# Patient Record
Sex: MALE | Race: WHITE | NOT HISPANIC OR LATINO | Employment: FULL TIME | URBAN - METROPOLITAN AREA
[De-identification: names, ages, dates, MRNs, and addresses within clinical notes are randomized per-mention and may not be internally consistent; named-entity substitution may affect disease eponyms.]

---

## 2018-02-07 ENCOUNTER — TELEPHONE (OUTPATIENT)
Dept: OTHER | Facility: CLINIC | Age: 33
End: 2018-02-07

## 2018-02-07 NOTE — TELEPHONE ENCOUNTER
"Patient Communication Preferences indicate  Do not contact  and/or communication by \"Phone\" is not preferred. Call not required per Outreach team.      Outreach ,  David Singleton     "

## 2018-05-14 ENCOUNTER — RECORDS - HEALTHEAST (OUTPATIENT)
Dept: LAB | Facility: CLINIC | Age: 33
End: 2018-05-14

## 2018-05-14 LAB
ALBUMIN SERPL-MCNC: 3.9 G/DL (ref 3.5–5)
ALP SERPL-CCNC: 59 U/L (ref 45–120)
ALT SERPL W P-5'-P-CCNC: 49 U/L (ref 0–45)
ANION GAP SERPL CALCULATED.3IONS-SCNC: 7 MMOL/L (ref 5–18)
AST SERPL W P-5'-P-CCNC: 27 U/L (ref 0–40)
BILIRUB SERPL-MCNC: 1 MG/DL (ref 0–1)
BUN SERPL-MCNC: 15 MG/DL (ref 8–22)
CALCIUM SERPL-MCNC: 9.7 MG/DL (ref 8.5–10.5)
CHLORIDE BLD-SCNC: 103 MMOL/L (ref 98–107)
CO2 SERPL-SCNC: 30 MMOL/L (ref 22–31)
CREAT SERPL-MCNC: 0.85 MG/DL (ref 0.7–1.3)
ERYTHROCYTE [DISTWIDTH] IN BLOOD BY AUTOMATED COUNT: 14 % (ref 11–14.5)
GFR SERPL CREATININE-BSD FRML MDRD: >60 ML/MIN/1.73M2
GLUCOSE BLD-MCNC: 103 MG/DL (ref 70–125)
HCT VFR BLD AUTO: 41.1 % (ref 40–54)
HGB BLD-MCNC: 13.7 G/DL (ref 14–18)
MCH RBC QN AUTO: 30 PG (ref 27–34)
MCHC RBC AUTO-ENTMCNC: 33.3 G/DL (ref 32–36)
MCV RBC AUTO: 90 FL (ref 80–100)
PLATELET # BLD AUTO: 217 THOU/UL (ref 140–440)
PMV BLD AUTO: 10.9 FL (ref 8.5–12.5)
POTASSIUM BLD-SCNC: 4.3 MMOL/L (ref 3.5–5)
PROT SERPL-MCNC: 7.5 G/DL (ref 6–8)
RBC # BLD AUTO: 4.57 MILL/UL (ref 4.4–6.2)
SODIUM SERPL-SCNC: 140 MMOL/L (ref 136–145)
TSH SERPL DL<=0.005 MIU/L-ACNC: 2.17 UIU/ML (ref 0.3–5)
URATE SERPL-MCNC: 6.9 MG/DL (ref 3–8)
WBC: 7.5 THOU/UL (ref 4–11)

## 2018-05-15 ENCOUNTER — RECORDS - HEALTHEAST (OUTPATIENT)
Dept: LAB | Facility: CLINIC | Age: 33
End: 2018-05-15

## 2018-05-15 LAB — FERRITIN SERPL-MCNC: 130 NG/ML (ref 27–300)

## 2018-05-17 LAB
GLIADIN IGA SER-ACNC: 1 U/ML
GLIADIN IGG SER-ACNC: <0.4 U/ML
IGA SERPL-MCNC: 274 MG/DL (ref 65–400)
TTG IGA SER-ACNC: 0.3 U/ML
TTG IGG SER-ACNC: <0.6 U/ML

## 2018-06-15 ASSESSMENT — MIFFLIN-ST. JEOR: SCORE: 2328.79

## 2018-06-19 ENCOUNTER — SURGERY - HEALTHEAST (OUTPATIENT)
Dept: SURGERY | Facility: CLINIC | Age: 33
End: 2018-06-19

## 2018-06-19 ENCOUNTER — ANESTHESIA - HEALTHEAST (OUTPATIENT)
Dept: SURGERY | Facility: CLINIC | Age: 33
End: 2018-06-19

## 2018-06-19 ASSESSMENT — MIFFLIN-ST. JEOR
SCORE: 2328.79
SCORE: 2328.79

## 2018-06-25 ENCOUNTER — NURSE TRIAGE (OUTPATIENT)
Dept: NURSING | Facility: CLINIC | Age: 33
End: 2018-06-25

## 2018-06-26 ENCOUNTER — APPOINTMENT (OUTPATIENT)
Dept: ULTRASOUND IMAGING | Facility: CLINIC | Age: 33
End: 2018-06-26
Attending: EMERGENCY MEDICINE
Payer: COMMERCIAL

## 2018-06-26 ENCOUNTER — HOSPITAL ENCOUNTER (EMERGENCY)
Facility: CLINIC | Age: 33
Discharge: HOME | End: 2018-06-26
Attending: EMERGENCY MEDICINE | Admitting: EMERGENCY MEDICINE
Payer: COMMERCIAL

## 2018-06-26 VITALS
HEIGHT: 72 IN | OXYGEN SATURATION: 97 % | BODY MASS INDEX: 40.63 KG/M2 | SYSTOLIC BLOOD PRESSURE: 140 MMHG | TEMPERATURE: 97.9 F | DIASTOLIC BLOOD PRESSURE: 81 MMHG | WEIGHT: 300 LBS | RESPIRATION RATE: 18 BRPM | HEART RATE: 78 BPM

## 2018-06-26 DIAGNOSIS — M79.661 PAIN OF RIGHT LOWER LEG: ICD-10-CM

## 2018-06-26 DIAGNOSIS — I82.4Z1 ACUTE DEEP VEIN THROMBOSIS (DVT) OF DISTAL VEIN OF RIGHT LOWER EXTREMITY (H): ICD-10-CM

## 2018-06-26 PROCEDURE — 25000128 H RX IP 250 OP 636: Performed by: EMERGENCY MEDICINE

## 2018-06-26 PROCEDURE — 93971 EXTREMITY STUDY: CPT | Mod: RT

## 2018-06-26 PROCEDURE — 25000132 ZZH RX MED GY IP 250 OP 250 PS 637: Performed by: EMERGENCY MEDICINE

## 2018-06-26 PROCEDURE — 96372 THER/PROPH/DIAG INJ SC/IM: CPT | Performed by: EMERGENCY MEDICINE

## 2018-06-26 PROCEDURE — 99284 EMERGENCY DEPT VISIT MOD MDM: CPT | Mod: Z6 | Performed by: EMERGENCY MEDICINE

## 2018-06-26 PROCEDURE — 99285 EMERGENCY DEPT VISIT HI MDM: CPT | Mod: 25 | Performed by: EMERGENCY MEDICINE

## 2018-06-26 RX ORDER — WARFARIN SODIUM 5 MG/1
5 TABLET ORAL DAILY
Qty: 7 TABLET | Refills: 0 | Status: SHIPPED | OUTPATIENT
Start: 2018-06-26 | End: 2018-07-18

## 2018-06-26 RX ORDER — OXYCODONE AND ACETAMINOPHEN 5; 325 MG/1; MG/1
1-2 TABLET ORAL
COMMUNITY
Start: 2018-06-10 | End: 2018-06-26

## 2018-06-26 RX ORDER — IBUPROFEN 600 MG/1
600 TABLET, FILM COATED ORAL
COMMUNITY
Start: 2018-04-08 | End: 2018-06-26

## 2018-06-26 RX ORDER — ASPIRIN 325 MG
325 TABLET ORAL
COMMUNITY
Start: 2018-06-20 | End: 2018-06-26

## 2018-06-26 RX ORDER — OXYCODONE AND ACETAMINOPHEN 5; 325 MG/1; MG/1
1-2 TABLET ORAL EVERY 4 HOURS PRN
Status: DISCONTINUED | OUTPATIENT
Start: 2018-06-26 | End: 2018-06-26 | Stop reason: HOSPADM

## 2018-06-26 RX ORDER — WARFARIN SODIUM 5 MG/1
5 TABLET ORAL ONCE
Status: COMPLETED | OUTPATIENT
Start: 2018-06-26 | End: 2018-06-26

## 2018-06-26 RX ORDER — HYDROMORPHONE HYDROCHLORIDE 2 MG/1
2 TABLET ORAL ONCE
Status: COMPLETED | OUTPATIENT
Start: 2018-06-26 | End: 2018-06-26

## 2018-06-26 RX ORDER — OXYCODONE AND ACETAMINOPHEN 5; 325 MG/1; MG/1
1-2 TABLET ORAL EVERY 4 HOURS PRN
Qty: 10 TABLET | Refills: 0 | Status: SHIPPED | OUTPATIENT
Start: 2018-06-26 | End: 2018-07-06

## 2018-06-26 RX ORDER — CELECOXIB 200 MG/1
200 CAPSULE ORAL
COMMUNITY
Start: 2018-06-20 | End: 2018-06-26

## 2018-06-26 RX ORDER — AMOXICILLIN 250 MG
1 CAPSULE ORAL
COMMUNITY
Start: 2018-06-20 | End: 2018-07-06

## 2018-06-26 RX ADMIN — WARFARIN SODIUM 5 MG: 5 TABLET ORAL at 03:53

## 2018-06-26 RX ADMIN — ENOXAPARIN SODIUM 135 MG: 150 INJECTION SUBCUTANEOUS at 03:53

## 2018-06-26 RX ADMIN — HYDROMORPHONE HYDROCHLORIDE 2 MG: 2 TABLET ORAL at 01:58

## 2018-06-26 NOTE — ED AVS SNAPSHOT
[facility] Emergency Department    [address] [address]    Phone:  [phone]    Fax:  [phone]                                       Timbo Carcamo   MRN: [id number]    Department:  [facility] Emergency Department   Date of Visit:  6/26/2018           After Visit Summary Signature Page     I have received my discharge instructions, and my questions have been answered. I have discussed any challenges I see with this plan with the nurse or doctor.    ..........................................................................................................................................  Patient/Patient Representative Signature      ..........................................................................................................................................  Patient Representative Print Name and Relationship to Patient    ..................................................               ................................................  Date                                            Time    ..........................................................................................................................................  Reviewed by Signature/Title    ...................................................              ..............................................  Date                                                            Time

## 2018-06-26 NOTE — ED AVS SNAPSHOT
[facility] Emergency Department    [address] [address]    Phone:  [phone]    Fax:  [phone]                                       Timbo Carcamo   MRN: [id number]    Department:  [facility] Emergency Department   Date of Visit:  6/26/2018           Patient Information     Date Of Birth          1985        Your diagnoses for this visit were:     Pain of right lower leg     Acute deep vein thrombosis (DVT) of distal vein of right lower extremity (H) post operative       You were seen by Jose Tamayo MD.      Follow-up Information     Follow up with Marquita Mayfield PA-C.    Specialty:  Physician Assistant    Why:  As needed    Contact information:    [address] [facility] [id number] [phone]          Follow up with [facility] Emergency Department.    Specialty:  EMERGENCY MEDICINE    Why:  If symptoms worsen    Contact information:    [address] [facility] [address] [phone]    Additional information:    The medical center is located at   [address] [place] [address] (between [facility] and   [facility] [place], [place] miles north   of [place]).        Discharge Instructions       Return if symptoms worsen or new symptoms develop.  Follow-up with your primary care physician later this week for recheck.  Take Lovenox as directed take Coumadin as directed.  Follow-up with Coumadin clinic in the next 2-3 days for check of blood counts.  If any bleeding increased pain fevers shortness of breath or other symptoms present please return for further evaluation and care.  Understanding Deep Vein Thrombosis  Deep vein thrombosis (DVT) is a condition in which a blood clot or thrombus forms in a deep vein. A blood clot is most common in the leg, but can develop in a large vein deep inside the leg, arm, or other part of the body. Part of the clot called an embolus can separate from the vein. It may travel to the lungs and form a  pulmonary embolus (PE). This can cut off the flow to a portion of the lung or to the entire lung. A PE is a medical emergency and may cause death. Healthcare providers use the term venous thromboembolism (VTE) to describe the two conditions, DVT and PE. They use the term VTE because the two conditions are very closely related. And, because their prevention and treatment are closely related.  Over time, a blood clot can also permanently damage veins. To protect your health, a blood clot must be treated right away.  How DVT develops  The deep veins of the legs are located in the muscles. These help carry blood clot from the legs to the heart. When leg muscles contract and relax, blood is squeezed through the veins back to the heart. One-way valves inside the veins help keep the blood moving in the right direction. When blood moves too slowly or not at all, it can pool in the veins. This makes a clot more likely to form.    Risk factors  Anyone can develop a blood clot. The following risk factors make a blood clot more likely to happen:    Being inactive for a long period, such as when you re in the hospital, or traveling by plane or car    Injury to a vein from an accident, a broken bone, or surgery    Having blood clots in the past    Personal or family history of a blood-clotting disorder    Recent surgery    Cancer and certain cancer treatments    Smoking  Other factors can also put you at higher risk for a blood clot. They include:    Age over 60 years    Pregnancy    Taking birth control or hormone replacement    Having other vein problems    Being overweight  Common symptoms  A blood clot does not always cause obvious symptoms. If you do have symptoms, they usually happen suddenly. They may include:    Pain, especially deep in the muscle    Swelling    Aching or tenderness    Red or warm skin    Fever  Call your healthcare provider if you have these symptoms.  Symptoms of pulmonary embolism may  include:    Trouble breathing    Fast heartbeat    Chest pain    Sweating    Coughing (may cough up blood)    Fainting  Call 911 if you have these symptoms.   If you take medicine to help prevent blood clots, you have an increased risk of bleeding. Call 911 if you have heavy or uncontrolled bleeding. Call your healthcare provider if you have other signs or symptoms of bleeding like blood in the urine, bleeding with bowel movements, or bleeding from the nose, gums, a cut, or vagina.  Diagnosing DVT  Diagnosis begins with thorough questions about your symptoms and medical history along with a physical exam.  Diagnostic tests include:    An imaging test called a duplex ultrasound. This test uses sound waves to create pictures of veins and blood flow.    Blood tests to check for clotting and other problems.  If your healthcare provider thinks you may have pulmonary embolism, additional testing will be done.  Treating DVT  Treating a blood clot may include:    Medicine to thin the blood and prevent pulmonary embolism and other complications.    Staying in the hospital. This may or may not be necessary.    Surgery for some people, like those who cannot take blood-thinning medicines.  Preventing DVT  Many people who are in the hospital are at an increased risk of developing blood clots. So, preventing blood clots is an important part of in-hospital care. The care may include getting out of bed regularly, taking medicine, or using special therapies or devices. Other factors and conditions may increase the risk of blood clots. Review your risk with your healthcare provider.  Date Last Reviewed: 5/1/2016 2000-2017 [employer]. [address] [place] [address], [place] [address]. All rights reserved. This information is not intended as a substitute for professional medical care. Always follow your healthcare professional's instructions.            * Deep Vein Thrombosis    Deep Vein Thrombosis (DVT) means there is a  blood clot in a deep vein of the leg. This may cause redness, swelling, warmth and pain of the leg. If the blood clot grows larger, a piece may break off and go to the lungs (pulmonary embolus)  Factors that increase risk of a DVT include: overweight, smoking, use of estrogen replacement therapy. Prolonged periods without movement (such as long distance travel, wearing a fracture cast, and prolonged bed rest after surgery or during an illness) also increases the risk of a DVT.  Home Care:    Wear compression stockings as directed by your doctor.    Move your ankles, toes and knees frequently to stimulate blood flow.    You will be prescribed a blood-thinning (anti-coagulant) medicine, either pills or shots. Take it exactly as directed.  Follow Up  Follow up with your doctor as advised. You will need regular blood tests to check your INR (International Normalized Ratio).  Get Prompt Medical Attention  Call your doctor or 911 if any of the following occur:    Shortness of breath or painful breathing    Chest pain, repeated cough or coughing up blood    Increasing swelling or increasing pain in the leg    Spreading redness    Unexpected bleeding (nose, gums, cuts, urinary tract, vagina, rectum)    0120-9619 [employer]. [address] [place] [address]. All rights reserved. This information is not intended as a substitute for professional medical care. Always follow your healthcare professional's instructions.  This information has been modified by your health care provider with permission from the publisher.          24 Hour Appointment Hotline       To make an appointment at any [facility] clinic, call [phone] ([phone]). If you don't have a family doctor or clinic, we will help you find one. [facility] are conveniently located to serve the needs of you and your family.          ED Discharge Orders     INR CLINIC REFERRAL       Your provider has referred you to INR  Services.    Please be aware that coverage of these services is subject to the terms and limitations of your health insurance plan.  Call member services at your health plan with any benefit or coverage questions.    Indication for Anticoagulation: DVT (first time)    Expected Duration of Therapy: 3 Months                     Review of your medicines      START taking        Dose / Directions Last dose taken    enoxaparin 150 MG/ML injection   Commonly known as:  LOVENOX   Dose:  1 mg/kg   Quantity:  14 Syringe        Inject 0.9 mLs (135 mg) Subcutaneous every 12 hours   Refills:  0        warfarin 5 MG tablet   Commonly known as:  COUMADIN   Dose:  5 mg   Quantity:  7 tablet        Take 1 tablet (5 mg) by mouth daily   Refills:  0          CONTINUE these medicines which may have CHANGED, or have new prescriptions. If we are uncertain of the size of tablets/capsules you have at home, strength may be listed as something that might have changed.        Dose / Directions Last dose taken    oxyCODONE-acetaminophen 5-325 MG per tablet   Commonly known as:  PERCOCET   Dose:  1-2 tablet   What changed:    - when to take this  - reasons to take this   Quantity:  10 tablet        Take 1-2 tablets by mouth every 4 hours as needed for pain or breakthrough pain   Refills:  0          Our records show that you are taking the medicines listed below. If these are incorrect, please call your family doctor or clinic.        Dose / Directions Last dose taken    ALLOPURINOL PO   Dose:  300 mg        Take 300 mg by mouth daily   Refills:  0        colchicine 0.6 MG tablet   Commonly known as:  COLCYRS   Dose:  0.6 mg   Quantity:  28 tablet        Take 1 tablet by mouth 2 times daily.   Refills:  0        gabapentin 300 MG capsule   Commonly known as:  NEURONTIN   Dose:  300 mg   Quantity:  90 capsule        Take 1 capsule (300 mg) by mouth 3 times daily OK to refill today. Need to follow up with MD before next refill.   Refills:  0         QUEtiapine 100 MG tablet   Commonly known as:  SEROQUEL   Dose:  100 mg   Quantity:  30 tablet        Take 1 tablet (100 mg) by mouth At Bedtime   Refills:  1        senna-docusate 8.6-50 MG per tablet   Commonly known as:  SENOKOT-S;PERICOLACE   Dose:  1 tablet        Take 1 tablet by mouth   Refills:  0        triamcinolone 0.1 % cream   Commonly known as:  KENALOG   Quantity:  30 g        Apply sparingly to affected area two times daily for 14 days.   Refills:  0                Information about OPIOIDS     PRESCRIPTION OPIOIDS: WHAT YOU NEED TO KNOW   We gave you an opioid (narcotic) pain medicine. It is important to manage your pain, but opioids are not always the best choice. You should first try all the other options your care team gave you. Take this medicine for as short a time (and as few doses) as possible.     These medicines have risks:    DO NOT drive when on new or higher doses of pain medicine. These medicines can affect your alertness and reaction times, and you could be arrested for driving under the influence (DUI). If you need to use opioids long-term, talk to your care team about driving.    DO NOT operate heave machinery    DO NOT do any other dangerous activities while taking these medicines.     DO NOT drink any alcohol while taking these medicines.      If the opioid prescribed includes acetaminophen, DO NOT take with any other medicines that contain acetaminophen. Read all labels carefully. Look for the word  acetaminophen  or  Tylenol.  Ask your pharmacist if you have questions or are unsure.    You can get addicted to pain medicines, especially if you have a history of addiction (chemical, alcohol or substance dependence). Talk to your care team about ways to reduce this risk.    Store your pills in a secure place, locked if possible. We will not replace any lost or stolen medicine. If you don t finish your medicine, please throw away (dispose) as directed by your pharmacist. The  [place] [employer]ution Control Agency has more information about safe disposal: https://www.pca.[facility].[place].us/living-green/managing-unwanted-medications.     All opioids tend to cause constipation. Drink plenty of water and eat foods that have a lot of fiber, such as fruits, vegetables, prune juice, apple juice and high-fiber cereal. Take a laxative (Miralax, milk of magnesia, Colace, Senna) if you don t move your bowels at least every other day.         Prescriptions were sent or printed at these locations (3 Prescriptions)                   Other Prescriptions                Printed at Department/Unit printer (3 of 3)         enoxaparin (LOVENOX) 150 MG/ML injection               oxyCODONE-acetaminophen (PERCOCET) 5-325 MG per tablet               warfarin (COUMADIN) 5 MG tablet                Procedures and tests performed during your visit     US Lower Extremity Venous Duplex Right      Orders Needing Specimen Collection     None      Pending Results     No orders found from 6/24/2018 to 6/27/2018.            Pending Culture Results     No orders found from 6/24/2018 to 6/27/2018.            Pending Results Instructions     If you had any lab results that were not finalized at the time of your Discharge, you can call the ED Lab Result RN at [phone]. You will be contacted by this team for any positive Lab results or changes in treatment. The nurses are available 7 days a week from 10A to 6:30P.  You can leave a message 24 hours per day and they will return your call.        Test Results From Your Hospital Stay        6/26/2018  2:23 AM      Narrative     US LOWER EXTREMITY VENOUS DUPLEX RIGHT  6/26/2018 2:10 AM     HISTORY: Status post right knee surgery, right calf swelling and pain.    TECHNIQUE: Venous Doppler ultrasound of the lower extremity. Color  flow and spectral Doppler with waveform analysis performed.    COMPARISON: None.    FINDINGS: Ultrasound of the right leg demonstrates no deep vein  thrombus  "from the common femoral through popliteal veins. There is DVT  present in the posterior tibial and peroneal veins in the calf.        Impression     IMPRESSION:  1. Study is positive for DVT in the right calf.  2. No thrombus seen in the right lower extremity veins above the level  of the knee.    DIANN COYLE MD                Thank you for choosing [facility]       Thank you for choosing [facility] for your care. Our goal is always to provide you with excellent care. Hearing back from our patients is one way we can continue to improve our services. Please take a few minutes to complete the written survey that you may receive in the mail after you visit with us. Thank you!        [employer] Information     [employer] lets you send messages to your doctor, view your test results, renew your prescriptions, schedule appointments and more. To sign up, go to [email or web address] . Click on \"Log in\" on the left side of the screen, which will take you to the Welcome page. Then click on \"Sign up Now\" on the right side of the page.     You will be asked to enter the access code listed below, as well as some personal information. Please follow the directions to create your username and password.     Your access code is: [email or web address]  Expires: 2018  3:54 AM     Your access code will  in 90 days. If you need help or a new code, please call your [facility] clinic or [phone].        Care EveryWhere ID     This is your Care EveryWhere ID. This could be used by other organizations to access your [facility] medical records  [id number]        Equal Access to Services     LARRY BOYER : Hadii clark chandra hadasho Soomaali, waaxda luqadaha, qaybta kaalmada adeegyada, madeline lange. So [facility] [phone].    ATENCIÓN: Si habla español, tiene a james disposición servicios gratuitos de asistencia lingüística. Llame al [phone].    We comply with applicable federal civil rights laws and [place] laws. We " do not discriminate on the basis of race, color, national origin, age, disability, sex, sexual orientation, or gender identity.            After Visit Summary       This is your record. Keep this with you and show to your community pharmacist(s) and doctor(s) at your next visit.

## 2018-06-26 NOTE — DISCHARGE INSTRUCTIONS
November 13, 2017         Patient: Matheus Mesa Sr.   YOB: 1968   Date of Visit: 11/13/2017           To Whom it May Concern:    Matheus Mesa was seen in my clinic on 11/13/2017. He may return to work on 11/15/17.    If you have any questions or concerns, please don't hesitate to call.        Sincerely,           Yasmin Alcaraz P.A.-C.  Electronically Signed      Return if symptoms worsen or new symptoms develop.  Follow-up with your primary care physician later this week for recheck.  Take Lovenox as directed take Coumadin as directed.  Follow-up with Coumadin clinic in the next 2-3 days for check of blood counts.  If any bleeding increased pain fevers shortness of breath or other symptoms present please return for further evaluation and care.  Understanding Deep Vein Thrombosis  Deep vein thrombosis (DVT) is a condition in which a blood clot or thrombus forms in a deep vein. A blood clot is most common in the leg, but can develop in a large vein deep inside the leg, arm, or other part of the body. Part of the clot called an embolus can separate from the vein. It may travel to the lungs and form a pulmonary embolus (PE). This can cut off the flow to a portion of the lung or to the entire lung. A PE is a medical emergency and may cause death. Healthcare providers use the term venous thromboembolism (VTE) to describe the two conditions, DVT and PE. They use the term VTE because the two conditions are very closely related. And, because their prevention and treatment are closely related.  Over time, a blood clot can also permanently damage veins. To protect your health, a blood clot must be treated right away.  How DVT develops  The deep veins of the legs are located in the muscles. These help carry blood clot from the legs to the heart. When leg muscles contract and relax, blood is squeezed through the veins back to the heart. One-way valves inside the veins help keep the blood moving in the right direction. When blood moves too slowly or not at all, it can pool in the veins. This makes a clot more likely to form.    Risk factors  Anyone can develop a blood clot. The following risk factors make a blood clot more likely to happen:    Being inactive for a long period, such as when you re in the hospital, or traveling by plane or car    Injury to a vein from an accident, a broken  bone, or surgery    Having blood clots in the past    Personal or family history of a blood-clotting disorder    Recent surgery    Cancer and certain cancer treatments    Smoking  Other factors can also put you at higher risk for a blood clot. They include:    Age over 60 years    Pregnancy    Taking birth control or hormone replacement    Having other vein problems    Being overweight  Common symptoms  A blood clot does not always cause obvious symptoms. If you do have symptoms, they usually happen suddenly. They may include:    Pain, especially deep in the muscle    Swelling    Aching or tenderness    Red or warm skin    Fever  Call your healthcare provider if you have these symptoms.  Symptoms of pulmonary embolism may include:    Trouble breathing    Fast heartbeat    Chest pain    Sweating    Coughing (may cough up blood)    Fainting  Call 911 if you have these symptoms.   If you take medicine to help prevent blood clots, you have an increased risk of bleeding. Call 911 if you have heavy or uncontrolled bleeding. Call your healthcare provider if you have other signs or symptoms of bleeding like blood in the urine, bleeding with bowel movements, or bleeding from the nose, gums, a cut, or vagina.  Diagnosing DVT  Diagnosis begins with thorough questions about your symptoms and medical history along with a physical exam.  Diagnostic tests include:    An imaging test called a duplex ultrasound. This test uses sound waves to create pictures of veins and blood flow.    Blood tests to check for clotting and other problems.  If your healthcare provider thinks you may have pulmonary embolism, additional testing will be done.  Treating DVT  Treating a blood clot may include:    Medicine to thin the blood and prevent pulmonary embolism and other complications.    Staying in the hospital. This may or may not be necessary.    Surgery for some people, like those who cannot take blood-thinning medicines.  Preventing DVT  Many  people who are in the hospital are at an increased risk of developing blood clots. So, preventing blood clots is an important part of in-hospital care. The care may include getting out of bed regularly, taking medicine, or using special therapies or devices. Other factors and conditions may increase the risk of blood clots. Review your risk with your healthcare provider.  Date Last Reviewed: 5/1/2016 2000-2017 [employer]. [address] [place] [address], [place] [id number]. All rights reserved. This information is not intended as a substitute for professional medical care. Always follow your healthcare professional's instructions.            * Deep Vein Thrombosis    Deep Vein Thrombosis (DVT) means there is a blood clot in a deep vein of the leg. This may cause redness, swelling, warmth and pain of the leg. If the blood clot grows larger, a piece may break off and go to the lungs (pulmonary embolus)  Factors that increase risk of a DVT include: overweight, smoking, use of estrogen replacement therapy. Prolonged periods without movement (such as long distance travel, wearing a fracture cast, and prolonged bed rest after surgery or during an illness) also increases the risk of a DVT.  Home Care:    Wear compression stockings as directed by your doctor.    Move your ankles, toes and knees frequently to stimulate blood flow.    You will be prescribed a blood-thinning (anti-coagulant) medicine, either pills or shots. Take it exactly as directed.  Follow Up  Follow up with your doctor as advised. You will need regular blood tests to check your INR (International Normalized Ratio).  Get Prompt Medical Attention  Call your doctor or 911 if any of the following occur:    Shortness of breath or painful breathing    Chest pain, repeated cough or coughing up blood    Increasing swelling or increasing pain in the leg    Spreading redness    Unexpected bleeding (nose, gums, cuts, urinary tract, vagina, rectum)     1469-6880 [employer]. [address] [place] [address], [place] [address]. All rights reserved. This information is not intended as a substitute for professional medical care. Always follow your healthcare professional's instructions.  This information has been modified by your health care provider with permission from the publisher.

## 2018-06-26 NOTE — TELEPHONE ENCOUNTER
Patient reports that he had surgery last week on his right knee. Now has pain in his right calf. The painful area feels hard and swollen. Denies that the painful area is red. Right foot is not cool to the touch and does not look blue. Said his pain is severe. Has taken ibuprofen with little pain relief. No chest pain. No difficulty breathing.    Protocol and care advice reviewed.   Caller states understanding of the recommended disposition.  Advised to call back if further questions or concerns.     Carolyne Cosme RN/FNA      Reason for Disposition    [1] SEVERE pain (e.g., excruciating, unable to do any normal activities) AND [2] not improved after 2 hours of pain medicine    Additional Information    Negative: Looks like a broken bone or dislocated joint (e.g., crooked or deformed)    Negative: Sounds like a life-threatening emergency to the triager    Negative: Followed a leg injury    Negative: Leg swelling is main symptom    Negative: Back pain radiating (shooting) into leg(s)    Negative: Knee pain is main symptom    Negative: Ankle pain is main symptom    Negative: Pregnant    Negative: Chest pain    Negative: Difficulty breathing    Negative: Entire foot is cool or blue in comparison to other side    Negative: Unable to walk    Negative: [1] Red area or streak AND [2] fever    Negative: [1] Swollen joint AND [2] fever    Negative: [1] Cast on leg or ankle AND [2] now increased pain    Negative: Patient sounds very sick or weak to the triager    Protocols used: LEG PAIN-ADULT-

## 2018-06-26 NOTE — ED TRIAGE NOTES
"Pt presents due to cramping pain in right calf that is moderate/severe in nature. Pt describes pain as \"constant throbbing\".. Pt had right ACL and ligament repair surgery on 6/19. Pt did take ibuprofen for discomfort without relief. Pt reports he is out of pain meds thus didn't take percocet.  Surgery was done by Dr. Landa. Pt has been taking ASA daily. CMS intact.   "

## 2018-06-26 NOTE — ED NOTES
Pt and wife educated on lovenox injections using teaching kit. Wife will be giving injections and was able to return demo correct administration.

## 2018-06-28 ENCOUNTER — TELEPHONE (OUTPATIENT)
Dept: ANTICOAGULATION | Facility: CLINIC | Age: 33
End: 2018-06-28

## 2018-06-28 NOTE — TELEPHONE ENCOUNTER
Patient started on enoxaparin and warfarin on 6/26. He will be establishing care with another provider at [facility] (he last saw a provider here in 2014). Writer spoke with Radha with [facility] (with Dr. Lester Landa). Their office will manage patient's warfarin dosing until he is able to establish care. Patient lives in [place] so it would make most sense for patient to see their Anticoagulation Clinic staff. Writer called and left a voicemail for [place] [facility] requesting a call back. Will route this note as well so they are aware of the plan.    Jeri Teresa RN, CACP 6/28/2018 at 8:55 AM

## 2018-06-29 DIAGNOSIS — M25.561 RIGHT KNEE PAIN: Primary | ICD-10-CM

## 2018-06-29 LAB — INR PPP: 1.43 (ref 0.86–1.14)

## 2018-06-29 PROCEDURE — 36415 COLL VENOUS BLD VENIPUNCTURE: CPT | Performed by: ORTHOPAEDIC SURGERY

## 2018-06-29 PROCEDURE — 85610 PROTHROMBIN TIME: CPT | Performed by: ORTHOPAEDIC SURGERY

## 2018-06-29 ASSESSMENT — ENCOUNTER SYMPTOMS
ACTIVITY CHANGE: 1
MYALGIAS: 1
VOMITING: 0
CHILLS: 0
ABDOMINAL PAIN: 0
SHORTNESS OF BREATH: 0
NUMBNESS: 0
WEAKNESS: 0
DYSURIA: 0
BACK PAIN: 0
ARTHRALGIAS: 1
NAUSEA: 0
BRUISES/BLEEDS EASILY: 0
FEVER: 0

## 2018-06-29 NOTE — ED PROVIDER NOTES
History     Chief Complaint   Patient presents with     Post-op Problem     cramping pain in right calf for past 8 hours, s/p ACL repair 5/19     HPI  Timbo Carcamo is a 32 year old male who present to the emergency department complaining of R calf pain. He recently had knee surgery on 6/19 with ACL and ligament repair by Dr. Landa. Patient had been doing well until today when he began having pain in his R lower calf. Pain is an ache which worsens with movement. There is no erythema or significant swelling the patient worsen with movement. He tried ibuprofen without improvement . He denies any fever or chills. His incision is healing well. No redness or drainage . She currently rates hs pain a 4/10.    Problem List:    Patient Active Problem List    Diagnosis Date Noted     Severe major depression, single episode (H) 06/28/2012     Priority: High     Seeing his psychiatirst Dr. Pili Galan at [facility] off of [facility].         CARDIOVASCULAR SCREENING; LDL GOAL LESS THAN 160 06/28/2012     Priority: Medium     Gout 06/28/2012     Priority: Medium     Increased BMI      Priority: Medium        Past Medical History:    Past Medical History:   Diagnosis Date     Gout      Increased BMI      MINNIE (obstructive sleep apnea)        Past Surgical History:    Past Surgical History:   Procedure Laterality Date     ORTHOPEDIC SURGERY         Family History:    No family history on file.    Social History:  Marital Status:  Single [1]  Social History   Substance Use Topics     Smoking status: Never Smoker     Smokeless tobacco: Never Used     Alcohol use Yes      Comment: socially        Medications:      ALLOPURINOL PO   colchicine 0.6 MG tablet   enoxaparin (LOVENOX) 150 MG/ML injection   oxyCODONE-acetaminophen (PERCOCET) 5-325 MG per tablet   senna-docusate (SENOKOT-S;PERICOLACE) 8.6-50 MG per tablet   warfarin (COUMADIN) 5 MG tablet   gabapentin (NEURONTIN) 300 MG capsule   QUEtiapine (SEROQUEL)  100 MG tablet   triamcinolone (KENALOG) 0.1 % cream         Review of Systems   Constitutional: Positive for activity change. Negative for chills and fever.   Respiratory: Negative for shortness of breath.    Cardiovascular: Positive for leg swelling. Negative for chest pain.   Gastrointestinal: Negative for abdominal pain, nausea and vomiting.   Genitourinary: Negative for dysuria.   Musculoskeletal: Positive for arthralgias, gait problem and myalgias. Negative for back pain.   Neurological: Negative for weakness and numbness.   Hematological: Does not bruise/bleed easily.       Physical Exam   BP: 158/70  Pulse: 78  Temp: 97.9  F (36.6  C)  Resp: 18  Height: 182.9 cm (6')  Weight: 136.1 kg (300 lb)  SpO2: 99 %      Physical Exam   Constitutional: He appears well-developed and well-nourished. No distress.   HENT:   Head: Normocephalic.   Eyes: Conjunctivae are normal.   Pulmonary/Chest: Effort normal.   Musculoskeletal:   R knee mild swelling with surgical incision present anteriorly. No drainage or erythema is present. R calf with tenderness to palpation Lateral and posterior. There is no ecchymosis or significant swelling. Pulses and sensation are symmetrical   Neurological: He is alert. He exhibits normal muscle tone.   Skin: Skin is warm and dry. No rash noted.   Psychiatric: He has a normal mood and affect.   Nursing note and vitals reviewed.      ED Course     ED Course     Procedures               Critical Care time:  none               Results for orders placed or performed during the hospital encounter of 06/26/18   US Lower Extremity Venous Duplex Right    Narrative    US LOWER EXTREMITY VENOUS DUPLEX RIGHT  6/26/2018 2:10 AM     HISTORY: Status post right knee surgery, right calf swelling and pain.    TECHNIQUE: Venous Doppler ultrasound of the lower extremity. Color  flow and spectral Doppler with waveform analysis performed.    COMPARISON: None.    FINDINGS: Ultrasound of the right leg demonstrates no  deep vein  thrombus from the common femoral through popliteal veins. There is DVT  present in the posterior tibial and peroneal veins in the calf.      Impression    IMPRESSION:  1. Study is positive for DVT in the right calf.  2. No thrombus seen in the right lower extremity veins above the level  of the knee.    DIANN COYLE MD         Medications   HYDROmorphone (DILAUDID) tablet 2 mg (2 mg Oral Given 6/26/18 0158)   warfarin (COUMADIN) tablet 5 mg (5 mg Oral Given 6/26/18 3759)       Assessments & Plan (with Medical Decision Making)Records were reviewed. R venous doppler us was obtained. Patient was given oral dilaudid. Venous doppler us with lower DVT in the posterior tibial and peroneal veins. Findings were discussed with Dr[facility] Coyle with [facility] who recommended treatment with lovenox and coumadin. Patient underwent coumadin training and took a dose of coumadin. He was referred to coumadin clinic . He will return if symptoms worsen or new symptoms develop.      I have reviewed the nursing notes.    I have reviewed the findings, diagnosis, plan and need for follow up with the patient.       Discharge Medication List as of 6/26/2018  4:06 AM      START taking these medications    Details   enoxaparin (LOVENOX) 150 MG/ML injection Inject 0.9 mLs (135 mg) Subcutaneous every 12 hours, Disp-14 Syringe, R-0, Local Print      warfarin (COUMADIN) 5 MG tablet Take 1 tablet (5 mg) by mouth daily, Disp-7 tablet, R-0, Local Print             Final diagnoses:   Pain of right lower leg   Acute deep vein thrombosis (DVT) of distal vein of right lower extremity (H) - post operative       6/26/2018   [facility] EMERGENCY DEPARTMENT     Jose Tamayo MD  06/29/18 7302

## 2018-07-03 ENCOUNTER — ANTICOAGULATION THERAPY VISIT (OUTPATIENT)
Dept: NURSING | Facility: CLINIC | Age: 33
End: 2018-07-03
Payer: COMMERCIAL

## 2018-07-03 DIAGNOSIS — I82.409 DEEP VEIN THROMBOSIS (DVT) (H): ICD-10-CM

## 2018-07-03 PROCEDURE — 99207 ZZC NO CHARGE NURSE ONLY: CPT | Performed by: ORTHOPAEDIC SURGERY

## 2018-07-03 NOTE — MR AVS SNAPSHOT
Timbo Carcamo   7/3/2018   Anticoagulation Therapy Visit    Description:  32 year old male   Provider:  Lester Landa MD   Department:  [facility]           INR as of 7/3/2018     Today's INR 1.43! (6/29/2018)      Anticoagulation Summary as of 7/3/2018     INR goal 2.0-3.0   Today's INR 1.43! (6/29/2018)   Full warfarin instructions 7/3: 5 mg; 7/4: 5 mg; Otherwise No maintenance plan   Next INR check 7/5/2018    Indications   Deep vein thrombosis (DVT) (H) [I82.409] [I82.409]         Contact Numbers     [facility]  Please call [phone] to cancel and/or reschedule your appointment   Please call [phone] with any problems or questions regarding your therapy.        July 2018 Details    Sun Mon Tue Wed Thu Fri Sat     1               2               3      5 mg   See details      4      5 mg         5            6               7                 8               9               10               11               12               13               14                 15               16               17               18               19               20               21                 22               23               24               25               26               27               28                 29               30               31                    Date Details   07/03 This INR check       Date of next INR:  7/5/2018         How to take your warfarin dose     To take:  5 mg Take 1 of the 5 mg tablets.

## 2018-07-03 NOTE — PROGRESS NOTES
ANTICOAGULATION FOLLOW-UP CLINIC VISIT    Patient Name:  Timbo Carcamo  Date:  7/3/2018  Contact Type:  Telephone    SUBJECTIVE:     Patient Findings     Positives Initiation of therapy, No Problem Findings    Comments Spoke with patient via phone. Patient states unable to make it to clinic. Did go and do walk in lab at [place]. Plans to have INR checked again 7/5/18. Explained need for twice a week testing until INR is in range. Patient verbalized understanding.Confirmed with patient that he is taking 5 mg daily and still taking Lovenox. Will follow up on 7/5           OBJECTIVE    INR   Date Value Ref Range Status   06/29/2018 1.43 (H) 0.86 - 1.14 Final       ASSESSMENT / PLAN  No question data found.  Anticoagulation Summary as of 7/3/2018     INR goal 2.0-3.0   Today's INR 1.43! (6/29/2018)   Warfarin maintenance plan No maintenance plan   Full warfarin instructions 7/3: 5 mg; 7/4: 5 mg; Otherwise No maintenance plan   Next INR check 7/5/2018   Target end date 9/26/2018    Indications   Deep vein thrombosis (DVT) (H) [I82.409] [I82.409]         Anticoagulation Episode Summary     INR check location     Preferred lab     Send INR reminders to [facility]    Comments       Anticoagulation Care Providers     Provider Role Specialty Phone number    Jose Tamayo MD Referring Emergency Medicine [phone]    Lester Landa MD Responsible Orthopedics [phone]            See the Encounter Report to view Anticoagulation Flowsheet and Dosing Calendar (Go to Encounters tab in chart review, and find the Anticoagulation Therapy Visit)    Dosage adjustment made based on physician directed care plan.    Sarah Beth La RN

## 2018-07-05 DIAGNOSIS — M25.561 RIGHT KNEE PAIN: ICD-10-CM

## 2018-07-05 LAB — INR PPP: 2.09 (ref 0.86–1.14)

## 2018-07-05 PROCEDURE — 85610 PROTHROMBIN TIME: CPT | Performed by: ORTHOPAEDIC SURGERY

## 2018-07-05 PROCEDURE — 36415 COLL VENOUS BLD VENIPUNCTURE: CPT | Performed by: ORTHOPAEDIC SURGERY

## 2018-07-06 ENCOUNTER — OFFICE VISIT (OUTPATIENT)
Dept: FAMILY MEDICINE | Facility: CLINIC | Age: 33
End: 2018-07-06
Payer: COMMERCIAL

## 2018-07-06 ENCOUNTER — ANTICOAGULATION THERAPY VISIT (OUTPATIENT)
Dept: NURSING | Facility: CLINIC | Age: 33
End: 2018-07-06
Payer: COMMERCIAL

## 2018-07-06 VITALS
HEIGHT: 72 IN | SYSTOLIC BLOOD PRESSURE: 116 MMHG | DIASTOLIC BLOOD PRESSURE: 82 MMHG | OXYGEN SATURATION: 99 % | RESPIRATION RATE: 18 BRPM | WEIGHT: 300 LBS | BODY MASS INDEX: 40.63 KG/M2 | HEART RATE: 97 BPM | TEMPERATURE: 98 F

## 2018-07-06 DIAGNOSIS — I82.441 ACUTE DEEP VEIN THROMBOSIS (DVT) OF TIBIAL VEIN OF RIGHT LOWER EXTREMITY (H): Primary | ICD-10-CM

## 2018-07-06 DIAGNOSIS — I82.409 DEEP VEIN THROMBOSIS (DVT) (H): ICD-10-CM

## 2018-07-06 DIAGNOSIS — S86.811D RUPTURE OF RIGHT PATELLAR TENDON, SUBSEQUENT ENCOUNTER: ICD-10-CM

## 2018-07-06 DIAGNOSIS — E66.01 MORBID OBESITY (H): ICD-10-CM

## 2018-07-06 PROCEDURE — 99207 ZZC NO CHARGE NURSE ONLY: CPT | Performed by: ORTHOPAEDIC SURGERY

## 2018-07-06 PROCEDURE — 99203 OFFICE O/P NEW LOW 30 MIN: CPT | Performed by: INTERNAL MEDICINE

## 2018-07-06 RX ORDER — ACETAMINOPHEN 500 MG
TABLET ORAL
COMMUNITY
Start: 2018-04-08

## 2018-07-06 RX ORDER — HYDROMORPHONE HYDROCHLORIDE 2 MG/1
2-4 TABLET ORAL EVERY 6 HOURS PRN
COMMUNITY

## 2018-07-06 RX ORDER — ONDANSETRON 4 MG/1
4 TABLET, ORALLY DISINTEGRATING ORAL
COMMUNITY
Start: 2018-04-08 | End: 2018-07-06

## 2018-07-06 ASSESSMENT — PAIN SCALES - GENERAL: PAINLEVEL: NO PAIN (1)

## 2018-07-06 NOTE — NURSING NOTE
Chief Complaint   Patient presents with     Establish Care     RECHECK     Post surgical DVT -right knee tendon repair on 6/19/18. Intense pain while laying in bed at night in right ankle. Slight relief with ice pack.      Refill Request     warfarin and pain medication       Initial /82 (BP Location: Right arm, Patient Position: Chair, Cuff Size: Adult Large)  Pulse 97  Temp 98  F (36.7  C) (Tympanic)  Resp 18  Ht 6' (1.829 m)  Wt 300 lb (136.1 kg)  SpO2 99%  BMI 40.69 kg/m2 Estimated body mass index is 40.69 kg/(m^2) as calculated from the following:    Height as of this encounter: 6' (1.829 m).    Weight as of this encounter: 300 lb (136.1 kg).    Medication Reconciliation: complete  Anna Castro MA

## 2018-07-06 NOTE — PATIENT INSTRUCTIONS
1. Continue with warfarin for minimum of 3 months.  In 3 months, get ultrasound to see if clot has resolved.  See DR. Blackburn after this.  2. Max dose of Tylenol is 3000 mg/day (2 Extra Strength Tylenol) up to 3 x day.

## 2018-07-06 NOTE — MR AVS SNAPSHOT
After Visit Summary   7/6/2018    Timbo Carcamo    MRN: [id number]           Patient Information     Date Of Birth          1985        Visit Information        Provider Department      7/6/2018 10:40 AM Venice Blackburn, DO [facility]        Today's Diagnoses     Rupture of right patellar tendon, subsequent encounter    -  1    Morbid obesity (H)        Acute deep vein thrombosis (DVT) of tibial vein of right lower extremity (H)          Care Instructions    1. Continue with warfarin for minimum of 3 months.  In 3 months, get ultrasound to see if clot has resolved.  See DR. Blackburn after this.  2. Max dose of Tylenol is 3000 mg/day (2 Extra Strength Tylenol) up to 3 x day.          Follow-ups after your visit        Additional Services     INR CLINIC REFERRAL       Your provider has referred you to INR Services.    Please be aware that coverage of these services is subject to the terms and limitations of your health insurance plan.  Call member services at your health plan with any benefit or coverage questions.    Indication for Anticoagulation: DVT (first time)  If nonstandard INR is desired, indicate goal range and explanation: n/a  Expected Duration of Therapy: 3-6 Months                  Future tests that were ordered for you today     Open Future Orders        Priority Expected Expires Ordered    US Lower Extremity Venous Duplex Right Routine 10/6/2018 7/6/2019 7/6/2018            Who to contact     If you have questions or need follow up information about today's clinic visit or your schedule please contact [facility] directly at [phone].  Normal or non-critical lab and imaging results will be communicated to you by MyChart, letter or phone within 4 business days after the clinic has received the results. If you do not hear from us within 7 days, please contact the clinic through MyChart or phone. If you have a critical or abnormal lab result, we will  "notify you by phone as soon as possible.  Submit refill requests through [employer] or call your pharmacy and they will forward the refill request to us. Please allow 3 business days for your refill to be completed.          Additional Information About Your Visit        [employer] Information     [employer] lets you send messages to your doctor, view your test results, renew your prescriptions, schedule appointments and more. To sign up, go to [email or web address] . Click on \"Log in\" on the left side of the screen, which will take you to the Welcome page. Then click on \"Sign up Now\" on the right side of the page.     You will be asked to enter the access code listed below, as well as some personal information. Please follow the directions to create your username and password.     Your access code is: [email or web address]  Expires: 2018  3:54 AM     Your access code will  in 90 days. If you need help or a new code, please call your [facility] clinic or [phone].        Care EveryWhere ID     This is your Care EveryWhere ID. This could be used by other organizations to access your [facility] medical records  [id number]        Your Vitals Were     Pulse Temperature Respirations Height Pulse Oximetry BMI (Body Mass Index)    97 98  F (36.7  C) (Tympanic) 18 6' (1.829 m) 99% 40.69 kg/m2       Blood Pressure from Last 3 Encounters:   18 116/82   18 140/81   14 140/84    Weight from Last 3 Encounters:   18 300 lb (136.1 kg)   18 300 lb (136.1 kg)   14 (!) 308 lb 3.2 oz (139.8 kg)              We Performed the Following     INR CLINIC REFERRAL          Today's Medication Changes          These changes are accurate as of 18 11:36 AM.  If you have any questions, ask your nurse or doctor.               These medicines have changed or have updated prescriptions.        Dose/Directions    warfarin 5 MG tablet   Commonly known as:  COUMADIN   This may have changed:  how much to take        " Dose:  5 mg   Take 1 tablet (5 mg) by mouth daily   Quantity:  7 tablet   Refills:  0         Stop taking these medicines if you haven't already. Please contact your care team if you have questions.     colchicine 0.6 MG tablet   Commonly known as:  COLCYRS   Stopped by:  Venice Blackburn DO           enoxaparin 150 MG/ML injection   Commonly known as:  LOVENOX   Stopped by:  Venice Blackburn DO           oxyCODONE-acetaminophen 5-325 MG per tablet   Commonly known as:  PERCOCET   Stopped by:  Venice Blackburn DO           QUEtiapine 100 MG tablet   Commonly known as:  SEROQUEL   Stopped by:  Venice Blackburn DO           senna-docusate 8.6-50 MG per tablet   Commonly known as:  SENOKOT-S;PERICOLACE   Stopped by:  Venice Blackburn DO           triamcinolone 0.1 % cream   Commonly known as:  KENALOG   Stopped by:  Venice Blackburn DO                   Information about OPIOIDS     PRESCRIPTION OPIOIDS: WHAT YOU NEED TO KNOW   We gave you an opioid (narcotic) pain medicine. It is important to manage your pain, but opioids are not always the best choice. You should first try all the other options your care team gave you. Take this medicine for as short a time (and as few doses) as possible.     These medicines have risks:    DO NOT drive when on new or higher doses of pain medicine. These medicines can affect your alertness and reaction times, and you could be arrested for driving under the influence (DUI). If you need to use opioids long-term, talk to your care team about driving.    DO NOT operate heave machinery    DO NOT do any other dangerous activities while taking these medicines.     DO NOT drink any alcohol while taking these medicines.      If the opioid prescribed includes acetaminophen, DO NOT take with any other medicines that contain acetaminophen. Read all labels carefully. Look for the word  acetaminophen  or  Tylenol.  Ask your pharmacist if you have questions or are  unsure.    You can get addicted to pain medicines, especially if you have a history of addiction (chemical, alcohol or substance dependence). Talk to your care team about ways to reduce this risk.    Store your pills in a secure place, locked if possible. We will not replace any lost or stolen medicine. If you don t finish your medicine, please throw away (dispose) as directed by your pharmacist. The [place] Pollution Control Agency has more information about safe disposal: https://www.pca.[facility].mn.us/living-green/managing-unwanted-medications.     All opioids tend to cause constipation. Drink plenty of water and eat foods that have a lot of fiber, such as fruits, vegetables, prune juice, apple juice and high-fiber cereal. Take a laxative (Miralax, milk of magnesia, Colace, Senna) if you don t move your bowels at least every other day.          Primary Care Provider Office Phone # Fax #    Marquita Mayfield PA-C [phone] [address] [address] [facility] [address]        Equal Access to Services     [facility]: Hadii clark ku hadasho Soomaali, waaxda luqadaha, qaybta kaalmada adeegyazofia, madeline arreola . So [facility] [phone].    ATENCIÓN: Si habla español, tiene a james disposición servicios gratuitos de asistencia lingüística. Mark al [phone].    We comply with applicable federal civil rights laws and [place] laws. We do not discriminate on the basis of race, color, national origin, age, disability, sex, sexual orientation, or gender identity.            Thank you!     Thank you for choosing [facility]  for your care. Our goal is always to provide you with excellent care. Hearing back from our patients is one way we can continue to improve our services. Please take a few minutes to complete the written survey that you may receive in the mail after your visit with us. Thank you!             Your Updated Medication List - Protect others around  you: Learn how to safely use, store and throw away your medicines at www.disposemymeds.org.          This list is accurate as of 7/6/18 11:36 AM.  Always use your most recent med list.                   Brand Name Dispense Instructions for use Diagnosis    acetaminophen 500 MG tablet    TYLENOL     Take 2 tablets by mouth no more than every four hours as needed for pain or fever. No more than 8 in 1 day. Max acetaminophen dose: 4000mg in 24 hrs.        ALLOPURINOL PO      Take 300 mg by mouth daily        gabapentin 300 MG capsule    NEURONTIN    90 capsule    Take 1 capsule (300 mg) by mouth 3 times daily OK to refill today. Need to follow up with MD before next refill.    Severe major depression, single episode (H)       HYDROmorphone 2 MG tablet    DILAUDID     Take 2-4 mg by mouth every 6 hours as needed for pain        warfarin 5 MG tablet    COUMADIN    7 tablet    Take 1 tablet (5 mg) by mouth daily

## 2018-07-06 NOTE — PROGRESS NOTES
"  SUBJECTIVE:   Timbo Carcamo is a 32 year old male who presents to clinic today for the following health issues:    Chief Complaint   Patient presents with     Establish Care     RECHECK     Post surgical DVT -right knee tendon repair on 18. Intense pain while laying in bed at night in right ankle. Slight relief with ice pack.      Refill Request     warfarin and pain medication     ED/UC Followup:  Facility:  [facility]  Date of visit: 18  Reason for visit: Pain of right lower leg, Acute DVT after Right knee, tendon repair on 18.  Current Status: Current pain while sittin/10         Newly diagnosed Right leg DVT .  Had recent knee surgery with DR. Landa.    6/10 # 20 percocet Karo pichardo   #10 pecocet Arabella Floyd   #60 percocet Arabella Alvarado   dilaudid #60 Lester Landa   #10 percocet kendell luna  Patient reports he does not need refills on any of his opiates today.  He understands his surgeon will manage his postoperative pain      He was being [facility] but reports no longer due to 'falling out'  Injury  \"jumping on trampoline at [place], pain to knee, unable to walk on it\"    The surgery was .    Post-operatively he developed DVT, seen in ER on  for this.  He reports the DVT caused severe pain so he was taking more of the pain medications.  He reports his PCP did not want to fill opiates any longer.  He felt mistreated by the provider so he decided to see care elsewhere.  He reports he is having severe right ankle pain primarily at night.  Elevation and ice help but minimally.  Ambulating is the only other thing that helps much.  Ortho told him 50% weight bear with crutches.  He saw Ortho post-op follow-up on , after diagnosis of blood clot.  Next follow-up is .  Physical therapy might be started next week.  He isn't having significant knee pain, it is mostly ankle pain.  Dilaudid makes him feel funny, doesn't like " it.    Is using dilaudid 4 mg BID, Tylenol 1000 mg every 4 hours.  He reports the Percocet he got filled 6/20 is all gone.      Mental health:  Follows with psychiatry, at [facility].  Is not currently on anything currently.  Reports he has PTSD and possibly bipolar.  He does not see a counselor.      Current Outpatient Prescriptions   Medication Sig Dispense Refill     acetaminophen (TYLENOL) 500 MG tablet Take 2 tablets by mouth no more than every four hours as needed for pain or fever. No more than 8 in 1 day. Max acetaminophen dose: 4000mg in 24 hrs.       ALLOPURINOL PO Take 300 mg by mouth daily       HYDROmorphone (DILAUDID) 2 MG tablet Take 2-4 mg by mouth every 6 hours as needed for pain       warfarin (COUMADIN) 5 MG tablet Take 1 tablet (5 mg) by mouth daily (Patient taking differently: Take 6 mg by mouth daily ) 7 tablet 0     gabapentin (NEURONTIN) 300 MG capsule Take 1 capsule (300 mg) by mouth 3 times daily OK to refill today. Need to follow up with MD before next refill. (Patient not taking: Reported on 7/6/2018) 90 capsule 0       Reviewed and updated as needed this visit by clinical staff  Tobacco  Allergies  Meds  Problems  Med Hx  Surg Hx  Fam Hx  Soc Hx        Reviewed and updated as needed this visit by Provider  Tobacco  Allergies  Meds  Problems  Med Hx  Surg Hx  Fam Hx  Soc Hx          ROS:  Constitutional, HEENT, cardiovascular, pulmonary, GI, , musculoskeletal, neuro, skin, endocrine and psych systems are negative, except as otherwise noted.    OBJECTIVE:     /82 (BP Location: Right arm, Patient Position: Chair, Cuff Size: Adult Large)  Pulse 97  Temp 98  F (36.7  C) (Tympanic)  Resp 18  Ht 6' (1.829 m)  Wt 300 lb (136.1 kg)  SpO2 99%  BMI 40.69 kg/m2  Body mass index is 40.69 kg/(m^2).  GENERAL APPEARANCE: healthy, alert, no distress and over weight  HENT: MMM  NECK: no adenopathy, no asymmetry, masses, or scars and thyroid normal to palpation  RESP: lungs clear  to auscultation - no rales, rhonchi or wheezes  CV: regular rates and rhythm, normal S1 S2, no S3 or S4 and no murmur, click or rub  ABDOMEN: soft, nontender, without hepatosplenomegaly or masses, bowel sounds normal and obese  MS: locking knee brace and ACE wrap on right lower leg  PSYCH: mentation appears normal and affect normal/bright      ASSESSMENT/PLAN:       1. Acute deep vein thrombosis (DVT) of tibial vein of right lower extremity (H) -advised patient to continue the warfarin for at least 3 months.  He should have an ultrasound to demonstrate resolution of the DVT prior to stopping the warfarin.  See provider after ultrasound to discuss results.  If this shows residual clot, will continue for another 3 months.  - INR CLINIC REFERRAL  - US Lower Extremity Venous Duplex Right; Future    2. Rupture of right patellar tendon, subsequent encounter -being managed by orthopedics.  Patient understands opiates to be filled by the orthopedic provider.    3. Morbid obesity (H) -probably contributed to his injury and his DVT.      Venice Blackburn,   [facility]

## 2018-07-06 NOTE — PROGRESS NOTES
ANTICOAGULATION FOLLOW-UP CLINIC VISIT    Patient Name:  Timbo Carcamo  Date:  7/6/2018  Contact Type:  Telephone    SUBJECTIVE:     Patient Findings     Positives No Problem Findings           OBJECTIVE    INR   Date Value Ref Range Status   07/05/2018 2.09 (H) 0.86 - 1.14 Final       ASSESSMENT / PLAN  No question data found.  Anticoagulation Summary as of 7/6/2018     INR goal 2.0-3.0   Today's INR 2.09 (7/5/2018)   Warfarin maintenance plan 5 mg (5 mg x 1) every day   Full warfarin instructions 5 mg every day   Weekly warfarin total 35 mg   Plan last modified Sarah Beth La RN (7/6/2018)   Next INR check 7/9/2018   Target end date 9/26/2018    Indications   Deep vein thrombosis (DVT) (H) [I82.409] [I82.409]         Anticoagulation Episode Summary     INR check location     Preferred lab     Send INR reminders to [facility] CL[facility]    Comments       Anticoagulation Care Providers     Provider Role Specialty Phone number    Jose Tamayo MD Referring Emergency Medicine [phone]    Lester Landa MD Responsible Orthopedics [phone]            See the Encounter Report to view Anticoagulation Flowsheet and Dosing Calendar (Go to Encounters tab in chart review, and find the Anticoagulation Therapy Visit)    Dosage adjustment made based on physician directed care plan.    Sarah Beth La RN

## 2018-07-06 NOTE — MR AVS SNAPSHOT
Timbo Carcamo   7/6/2018   Anticoagulation Therapy Visit    Description:  32 year old male   Provider:  Lester Landa MD   Department:  [facility]           INR as of 7/6/2018     Today's INR 2.09 (7/5/2018)      Anticoagulation Summary as of 7/6/2018     INR goal 2.0-3.0   Today's INR 2.09 (7/5/2018)   Full warfarin instructions 5 mg every day   Next INR check 7/9/2018    Indications   Deep vein thrombosis (DVT) (H) [I82.409] [I82.409]         Contact Numbers     [facility]  Please call [phone] to cancel and/or reschedule your appointment   Please call [phone] with any problems or questions regarding your therapy.        July 2018 Details    Sun Mon Tue Wed Thu Fri Sat     1               2               3               4               5               6      5 mg   See details      7      5 mg           8      5 mg         9            10               11               12               13               14                 15               16               17               18               19               20               21                 22               23               24               25               26               27               28                 29               30               31                    Date Details   07/06 This INR check       Date of next INR:  7/9/2018         How to take your warfarin dose     To take:  5 mg Take 1 of the 5 mg tablets.

## 2018-07-09 DIAGNOSIS — M25.561 RIGHT KNEE PAIN: ICD-10-CM

## 2018-07-09 LAB — INR PPP: 2.56 (ref 0.86–1.14)

## 2018-07-09 PROCEDURE — 36415 COLL VENOUS BLD VENIPUNCTURE: CPT | Performed by: ORTHOPAEDIC SURGERY

## 2018-07-09 PROCEDURE — 85610 PROTHROMBIN TIME: CPT | Performed by: ORTHOPAEDIC SURGERY

## 2018-07-10 ENCOUNTER — ANTICOAGULATION THERAPY VISIT (OUTPATIENT)
Dept: NURSING | Facility: CLINIC | Age: 33
End: 2018-07-10
Payer: COMMERCIAL

## 2018-07-10 DIAGNOSIS — I82.409 DEEP VEIN THROMBOSIS (DVT) (H): ICD-10-CM

## 2018-07-10 PROCEDURE — 99207 ZZC NO CHARGE NURSE ONLY: CPT | Performed by: INTERNAL MEDICINE

## 2018-07-10 NOTE — MR AVS SNAPSHOT
Timbo Carcamo   7/10/2018   Anticoagulation Therapy Visit    Description:  32 year old male   Provider:  Venice Blackburn DO   Department:  [facility]           INR as of 7/10/2018     Today's INR 2.56 (7/9/2018)      Anticoagulation Summary as of 7/10/2018     INR goal 2.0-3.0   Today's INR 2.56 (7/9/2018)   Full warfarin instructions 6 mg every day   Next INR check 7/16/2018    Indications   Deep vein thrombosis (DVT) (H) [I82.409] [I82.409]         Contact Numbers     [facility]  Please call [phone] to cancel and/or reschedule your appointment   Please call [phone] with any problems or questions regarding your therapy.        July 2018 Details    Sun Mon Tue Wed Thu Fri Sat     1               2               3               4               5               6               7                 8               9               10      6 mg   See details      11      6 mg         12      6 mg         13      6 mg         14      6 mg           15      6 mg         16            17               18               19               20               21                 22               23               24               25               26               27               28                 29               30               31                    Date Details   07/10 This INR check       Date of next INR:  7/16/2018         How to take your warfarin dose     To take:  6 mg Take 3 of the 2 mg tablets.

## 2018-07-10 NOTE — PROGRESS NOTES
ANTICOAGULATION FOLLOW-UP CLINIC VISIT    Patient Name:  Timbo Carcamo  Date:  7/10/2018  Contact Type:  Telephone    SUBJECTIVE:     Patient Findings     Positives No Problem Findings    Comments Patient states that he has been taking 6 mg daily for over a week. Changed maintenance dose to reflect that.            OBJECTIVE    INR   Date Value Ref Range Status   07/09/2018 2.56 (H) 0.86 - 1.14 Final       ASSESSMENT / PLAN  No question data found.  Anticoagulation Summary as of 7/10/2018     INR goal 2.0-3.0   Today's INR 2.56 (7/9/2018)   Warfarin maintenance plan 6 mg (2 mg x 3) every day   Full warfarin instructions 6 mg every day   Weekly warfarin total 42 mg   Plan last modified Sarah Beth La RN (7/10/2018)   Next INR check 7/16/2018   Target end date 9/26/2018    Indications   Deep vein thrombosis (DVT) (H) [I82.409] [I82.409]         Anticoagulation Episode Summary     INR check location     Preferred lab     Send INR reminders to [facility] CLINIC    Comments       Anticoagulation Care Providers     Provider Role Specialty Phone number    Venice Blackburn Nolvia,  [facility] Internal Medicine [phone]            See the Encounter Report to view Anticoagulation Flowsheet and Dosing Calendar (Go to Encounters tab in chart review, and find the Anticoagulation Therapy Visit)    Dosage adjustment made based on physician directed care plan.    Sarah Beth La RN

## 2018-07-18 ENCOUNTER — TELEPHONE (OUTPATIENT)
Dept: FAMILY MEDICINE | Facility: CLINIC | Age: 33
End: 2018-07-18

## 2018-07-18 DIAGNOSIS — I82.409 DVT (DEEP VENOUS THROMBOSIS) (H): Primary | ICD-10-CM

## 2018-07-18 RX ORDER — WARFARIN SODIUM 2 MG/1
6 TABLET ORAL DAILY
Qty: 90 TABLET | Refills: 1 | Status: SHIPPED | OUTPATIENT
Start: 2018-07-18 | End: 2024-04-05

## 2018-07-18 RX ORDER — WARFARIN SODIUM 5 MG/1
5 TABLET ORAL DAILY
Qty: 7 TABLET | Refills: 0 | Status: CANCELLED | OUTPATIENT
Start: 2018-07-18

## 2018-07-18 NOTE — TELEPHONE ENCOUNTER
[facility] call us today and verbalize that pt has calling us for the past 2 days about his coumadin.   Pt is out of Coumadin for the past 2 days, need refills.  Staff called pt to verify which pharmacy this should go, but unable to get a hold on the pt.  Left a voice msg for pt to call back.  When pt calls back transfer to the RN.  Provider informed.  Angeline Nava CMA (FERNY)   (aka: Syl Nava)

## 2018-07-18 NOTE — TELEPHONE ENCOUNTER
Patient is followed by the [facility]. I left the [facility] clinic a VM requesting their pool number so I could route them this call. I am awaiting a call back.    Onesimo Zamora RN, BSN, PHN  Anticoagulation Clinic   [phone]

## 2018-07-18 NOTE — TELEPHONE ENCOUNTER
Left message on patient's VM to call [phone] or [phone] to let us know what pharmacy to send in coumadin too. Also advised that he needs to have his INR checked tammy La RN - BC

## 2018-07-18 NOTE — TELEPHONE ENCOUNTER
7/18  He is seen by the [facility]. I will route to their pool.    Onesimo Zamora RN, BSN, PHN  Anticoagulation Clinic   [phone]          [facility]'s message [facility]. OUT OF COUMADIN. Needing refill.  Thanks,  Abby BRANHAM RN

## 2018-07-23 ENCOUNTER — TELEPHONE (OUTPATIENT)
Dept: NURSING | Facility: CLINIC | Age: 33
End: 2018-07-23

## 2018-07-23 NOTE — LETTER
September 6, 2018      Timbo Carcamo  [address]  [place] [address]        Dear Timbo,     Medical Alert!    Regular follow-up care while on anti-coagulation medication is vital to your health because of your underlying health condition. Your medication can prevent strokes or clots from forming. It can also cause life-threatening bleeding complicaitons IF it is not monitored on a regular basis.     After multiple attempts to contact you by phone we can no longer assume the risk to have you in our coumadin program or provide refills of your medication.    A copy of this letter will be provided to your primary physician and we ask that you please consider scheduling an appointment today by calling [phone] with your primary care physician. Your health is important to us and we would like to partner with you for a healthy future.      Sincerely,        Venice Blackburn, DO

## 2018-07-23 NOTE — LETTER
July 23, 2018      Timbo Carcamo  [address]  [place] [address]                Dear Timbo,     Regular follow-up care while on anti-coagulation medication (Warfarin) is vital to your health because of your underlying health condition. Your medication can prevent strokes or clots from forming. It can also cause life-threatening bleeding complications IF it is not monitored on a regular basis. You are over due to have your INR checked.     Please call [phone] or [phone] to schedule an INR appointment at your earliest convenience.        Sincerely,    Venice Blackburn, DO

## 2018-08-16 NOTE — TELEPHONE ENCOUNTER
Dr. Blackburn,  Patient has been called several times and a letter sent as a reminder to follow up with INR. Patient has only had INR checked 3 times since started on warfarin therapy on 7/6/18. The last time he had a lab drawn was on 7/10/18. Patient's non-compliance makes him a candidate to be discharged from INR clinic.    Please advise.    Sarah Beth La RN - BC

## 2018-08-16 NOTE — TELEPHONE ENCOUNTER
Message left for patient to call the INR Clinic # at [phone]. Patient is overdue for INR check.     Routing to provider to advise of non compliance.     Sarah Beth La RN - BC

## 2018-08-29 NOTE — TELEPHONE ENCOUNTER
Spoke to patient of importance of following up while on coumadin. Advised to go to clinic and have lab drawn this week. Patient states he will if he can find a ride. States he is taking his warfarin.    Sarah Beth La RN - BC

## 2018-09-06 ENCOUNTER — ANTICOAGULATION THERAPY VISIT (OUTPATIENT)
Dept: NURSING | Facility: CLINIC | Age: 33
End: 2018-09-06

## 2018-09-06 DIAGNOSIS — I82.409 DEEP VEIN THROMBOSIS (DVT) (H): ICD-10-CM

## 2018-09-06 NOTE — TELEPHONE ENCOUNTER
Routing letter to provider to preview.  Please sign and send if in agreement.    Sarah Beth La RN - BC

## 2020-02-24 NOTE — TELEPHONE ENCOUNTER
Patient returning the call. Please call him.    Quality 110: Preventive Care And Screening: Influenza Immunization: Influenza Immunization Administered during Influenza season Detail Level: Detailed Quality 130: Documentation Of Current Medications In The Medical Record: Current Medications Documented Quality 402: Tobacco Use And Help With Quitting Among Adolescents: Patient screened for tobacco and never smoked Quality 431: Preventive Care And Screening: Unhealthy Alcohol Use - Screening: Patient screened for unhealthy alcohol use using a single question and scores less than 2 times per year

## 2021-06-01 VITALS — BODY MASS INDEX: 40.63 KG/M2 | HEIGHT: 72 IN | WEIGHT: 300 LBS

## 2021-06-18 NOTE — ANESTHESIA PROCEDURE NOTES
Peripheral Block    Patient location during procedure: pre-op  Start time: 6/19/2018 12:35 PM  End time: 6/19/2018 12:38 PM  post-op analgesia per surgeon order as noted in medical record  Staffing:  Performing  Anesthesiologist: SHANITA PANTOJA  Preanesthetic Checklist  Completed: patient identified, site marked, risks, benefits, and alternatives discussed, timeout performed, consent obtained, airway assessed, oxygen available, suction available, emergency drugs available and hand hygiene performed  Peripheral Block  Block type: femoral  Prep: ChloraPrep  Patient position: supine  Patient monitoring: cardiac monitor, continuous pulse oximetry, heart rate and blood pressure  Laterality: right  Injection technique: ultrasound guided            Needle  Needle type: Stimuplex   Needle gauge: 21 G  Needle length: 4 in  no peripheral nerve catheter placed  Assessment  Injection assessment: no difficulty with injection, negative aspiration for heme, no paresthesia on injection and incremental injection

## 2021-06-18 NOTE — ANESTHESIA CARE TRANSFER NOTE
Last vitals:   Vitals:    06/19/18 1305   BP: 133/65   Pulse: 68   Resp: 19   Temp:    SpO2: 99%     Patient's level of consciousness is drowsy  Spontaneous respirations: yes  Maintains airway independently: yes  Dentition unchanged: yes  Oropharynx: oropharynx clear of all foreign objects    QCDR Measures:  ASA# 20 - Surgical Safety Checklist: WHO surgical safety checklist completed prior to induction  PQRS# 430 - Adult PONV Prevention: 4558F - Pt received => 2 anti-emetic agents (different classes) preop & intraop  ASA# 8 - Peds PONV Prevention: 4558F - Pt received => 2 anti-emetic agents (different classes) preop & intraop  PQRS# 424 - Amber-op Temp Management: 4559F - At least one body temp DOCUMENTED => 35.5C or 95.9F within required timeframe  PQRS# 426 - PACU Transfer Protocol: - Transfer of care checklist used  ASA# 14 - Acute Post-op Pain: ASA14B - Patient did NOT experience pain >= 7 out of 10

## 2021-06-18 NOTE — ANESTHESIA POSTPROCEDURE EVALUATION
Patient: Timbo Carcamo  RIGHT KNEE OPEN PATELLAR TENDON REPAIR WITH ALLOGRAFT,  ANTERIOR CRUCIATE LIGAMENT RECONSTRUCTION, MEDIAL AND LATERAL MENISCUS REPAIRS  Anesthesia type: general    Patient location: PACU  Last vitals:   Vitals:    06/19/18 1900   BP: 137/73   Pulse: 65   Resp: 12   Temp:    SpO2: 100%     Post vital signs: stable  Level of consciousness: awake and responds to simple questions  Post-anesthesia pain: pain controlled  Post-anesthesia nausea and vomiting: no  Pulmonary: unassisted, return to baseline  Cardiovascular: stable and blood pressure at baseline  Hydration: adequate  Anesthetic events: no    QCDR Measures:  ASA# 11 - Amber-op Cardiac Arrest: ASA11B - Patient did NOT experience unanticipated cardiac arrest  ASA# 12 - Amber-op Mortality Rate: ASA12B - Patient did NOT die  ASA# 13 - PACU Re-Intubation Rate: ASA13B - Patient did NOT require a new airway mgmt  ASA# 10 - Composite Anes Safety: ASA10A - No serious adverse event    Additional Notes:    brandie orders in place - pt with profound brandie sx after administration of narcotics

## 2021-06-18 NOTE — ANESTHESIA PROCEDURE NOTES
Peripheral Block    Patient location during procedure: pre-op  Start time: 6/19/2018 12:30 PM  End time: 6/19/2018 12:34 PM  post-op analgesia per surgeon order as noted in medical record  Staffing:  Performing  Anesthesiologist: SHANITA PANTOJA  Preanesthetic Checklist  Completed: patient identified, site marked, risks, benefits, and alternatives discussed, timeout performed, consent obtained, airway assessed, oxygen available, suction available, emergency drugs available and hand hygiene performed  Peripheral Block  Block type: other, tibial  Prep: ChloraPrep  Patient position: supine  Patient monitoring: cardiac monitor, continuous pulse oximetry, heart rate and blood pressure  Laterality: right  Injection technique: ultrasound guided            Needle  Needle type: Stimuplex   Needle gauge: 21 G  Needle length: 4 in  no peripheral nerve catheter placed  Assessment  Injection assessment: no difficulty with injection, negative aspiration for heme, no paresthesia on injection and incremental injection

## 2021-06-18 NOTE — ANESTHESIA PREPROCEDURE EVALUATION
Anesthesia Evaluation      Patient summary reviewed   No history of anesthetic complications     Airway   Mallampati: II   Pulmonary - negative ROS and normal exam                          Cardiovascular - negative ROS  Exercise tolerance: > or = 4 METS  Rhythm: regular  Rate: normal,         Neuro/Psych - negative ROS     Endo/Other    (+) obesity,      Comments: gout    GI/Hepatic/Renal - negative ROS      Other findings: Results for PAMELLA GONZALES (MRN [id number]) as of 6/19/2018 12:51    5/14/2018 12:15  Sodium: 140  Potassium: 4.3  Chloride: 103  CO2: 30  Anion Gap, Calculation: 7  BUN: 15  Creatinine: 0.85  GFR MDRD Af Amer: >60  GFR MDRD Non Af Amer: >60  Results for PAMELLA GONZALES (MRN [id number]) as of 6/19/2018 12:51    5/14/2018 12:15  WBC: 7.5  RBC: 4.57  Hemoglobin: 13.7 (L)  Hematocrit: 41.1  MCV: 90  MCH: 30.0  MCHC: 33.3  RDW: 14.0  Platelets: 217        Dental - normal exam                        Anesthesia Plan  Planned anesthetic: general endotracheal  surgeon requests GAETT  Femoral nerve block for POP  Selective tibial nerve block for POP  Soft bite block  zofran/decadron    ASA 3   Induction: intravenous   Anesthetic plan and risks discussed with: patient  Anesthesia plan special considerations: antiemetics,   Post-op plan: routine recovery

## 2024-04-05 ENCOUNTER — HOSPITAL ENCOUNTER (EMERGENCY)
Facility: CLINIC | Age: 39
Discharge: HOME | End: 2024-04-05
Attending: EMERGENCY MEDICINE | Admitting: EMERGENCY MEDICINE
Payer: COMMERCIAL

## 2024-04-05 VITALS
SYSTOLIC BLOOD PRESSURE: 133 MMHG | OXYGEN SATURATION: 98 % | RESPIRATION RATE: 16 BRPM | TEMPERATURE: 98.1 F | BODY MASS INDEX: 37.93 KG/M2 | HEART RATE: 70 BPM | WEIGHT: 280 LBS | HEIGHT: 72 IN | DIASTOLIC BLOOD PRESSURE: 84 MMHG

## 2024-04-05 DIAGNOSIS — T14.8XXA MUSCLE STRAIN: ICD-10-CM

## 2024-04-05 DIAGNOSIS — M54.2 NECK PAIN: ICD-10-CM

## 2024-04-05 DIAGNOSIS — S13.4XXA WHIPLASH INJURY TO NECK, INITIAL ENCOUNTER: ICD-10-CM
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
## 2024-04-05 PROCEDURE — 99283 EMERGENCY DEPT VISIT LOW MDM: CPT | Performed by: EMERGENCY MEDICINE

## 2024-04-05 PROCEDURE — 99282 EMERGENCY DEPT VISIT SF MDM: CPT | Performed by: EMERGENCY MEDICINE

## 2024-04-05 RX ORDER — DEXTROAMPHETAMINE SULFATE 15 MG/1
15 TABLET ORAL
COMMUNITY

## 2024-04-05 ASSESSMENT — COLUMBIA-SUICIDE SEVERITY RATING SCALE - C-SSRS
2. HAVE YOU ACTUALLY HAD ANY THOUGHTS OF KILLING YOURSELF IN THE PAST MONTH?: NO
6. HAVE YOU EVER DONE ANYTHING, STARTED TO DO ANYTHING, OR PREPARED TO DO ANYTHING TO END YOUR LIFE?: NO
1. IN THE PAST MONTH, HAVE YOU WISHED YOU WERE DEAD OR WISHED YOU COULD GO TO SLEEP AND NOT WAKE UP?: NO

## 2024-04-05 ASSESSMENT — ACTIVITIES OF DAILY LIVING (ADL): ADLS_ACUITY_SCORE: 35

## 2024-04-05 NOTE — DISCHARGE INSTRUCTIONS
Tylenol and ibuprofen as needed for pain.    Follow-up in clinic if not improving next week.    Return or be seen if new or worsening symptoms develop.
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
